# Patient Record
Sex: MALE | URBAN - METROPOLITAN AREA
[De-identification: names, ages, dates, MRNs, and addresses within clinical notes are randomized per-mention and may not be internally consistent; named-entity substitution may affect disease eponyms.]

---

## 2023-04-28 ENCOUNTER — OFFICE VISIT (OUTPATIENT)
Dept: URBAN - METROPOLITAN AREA CLINIC 27 | Facility: CLINIC | Age: 48
End: 2023-04-28

## 2023-04-28 DIAGNOSIS — H35.371 PUCKERING OF MACULA, RIGHT EYE: Primary | ICD-10-CM

## 2023-04-28 DIAGNOSIS — H33.8 OTHER RETINAL DETACHMENTS: ICD-10-CM

## 2023-04-28 DIAGNOSIS — Z96.1 PRESENCE OF INTRAOCULAR LENS: ICD-10-CM

## 2023-04-28 DIAGNOSIS — H04.123 DRY EYE SYNDROME OF BILATERAL LACRIMAL GLANDS: ICD-10-CM

## 2023-04-28 DIAGNOSIS — H35.412 LATTICE DEGENERATION OF RETINA, LEFT EYE: ICD-10-CM

## 2023-04-28 PROCEDURE — 92134 CPTRZ OPH DX IMG PST SGM RTA: CPT | Performed by: OPHTHALMOLOGY

## 2023-04-28 PROCEDURE — 99204 OFFICE O/P NEW MOD 45 MIN: CPT | Performed by: OPHTHALMOLOGY

## 2023-04-28 ASSESSMENT — INTRAOCULAR PRESSURE
OD: 20
OS: 20

## 2023-04-28 NOTE — IMPRESSION/PLAN
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 Status: Chronic. S/P PPV / SBP / SO exchange / MP / PRP 05/01/09 ([address] [place] [address]) S/P PPV,Rem SO, MP,PRP, Gas 10/26/09 ([facility]) Plan: RDW.

## 2023-04-28 NOTE — IMPRESSION/PLAN
Impression: ERM, OD. Status: Symptomatic. Worse Plan: Exam and OCT reveal an ERM OD (prior 604 microns, from 256 microns). Follow. Carotenoids. 

Return in 18 months for follow up, OCT OU

## 2023-04-28 NOTE — IMPRESSION/PLAN
Impression: Lattice degeneration, OS. Status: Chronic.
s/p laser OS inferotemporal. Plan: RDW. Good laser treatment. Observe.